# Patient Record
Sex: FEMALE | Race: WHITE | URBAN - METROPOLITAN AREA
[De-identification: names, ages, dates, MRNs, and addresses within clinical notes are randomized per-mention and may not be internally consistent; named-entity substitution may affect disease eponyms.]

---

## 2017-08-07 ENCOUNTER — HOSPITAL ENCOUNTER (OUTPATIENT)
Dept: LAB | Age: 42
Discharge: HOME | End: 2017-08-07
Payer: COMMERCIAL

## 2017-08-07 DIAGNOSIS — Z01.818 PRE-OP EVALUATION: ICD-10-CM

## 2017-08-07 LAB
ANION GAP BLD CALC-SCNC: 8 MMOL/L (ref 3–18)
BUN SERPL-MCNC: 14 MG/DL (ref 7–18)
BUN/CREAT SERPL: 22 (ref 12–20)
CALCIUM SERPL-MCNC: 9.1 MG/DL (ref 8.5–10.1)
CHLORIDE SERPL-SCNC: 104 MMOL/L (ref 100–108)
CO2 SERPL-SCNC: 28 MMOL/L (ref 21–32)
CREAT SERPL-MCNC: 0.63 MG/DL (ref 0.6–1.3)
GLUCOSE SERPL-MCNC: 111 MG/DL (ref 74–99)
POTASSIUM SERPL-SCNC: 4.1 MMOL/L (ref 3.5–5.5)
SODIUM SERPL-SCNC: 140 MMOL/L (ref 136–145)

## 2017-08-07 PROCEDURE — 36415 COLL VENOUS BLD VENIPUNCTURE: CPT | Performed by: ORTHOPAEDIC SURGERY

## 2017-08-07 PROCEDURE — 93005 ELECTROCARDIOGRAM TRACING: CPT

## 2017-08-07 PROCEDURE — 80048 BASIC METABOLIC PNL TOTAL CA: CPT | Performed by: ORTHOPAEDIC SURGERY

## 2017-08-08 LAB
ATRIAL RATE: 57 BPM
CALCULATED P AXIS, ECG09: 72 DEGREES
CALCULATED R AXIS, ECG10: 16 DEGREES
CALCULATED T AXIS, ECG11: 28 DEGREES
DIAGNOSIS, 93000: NORMAL
P-R INTERVAL, ECG05: 126 MS
Q-T INTERVAL, ECG07: 396 MS
QRS DURATION, ECG06: 84 MS
QTC CALCULATION (BEZET), ECG08: 385 MS
VENTRICULAR RATE, ECG03: 57 BPM

## 2020-01-27 ENCOUNTER — TELEPHONE (OUTPATIENT)
Dept: CARDIOLOGY CLINIC | Age: 45
End: 2020-01-27

## 2020-01-27 NOTE — TELEPHONE ENCOUNTER
New patient referral received from [address] [facility] [address] Chantel Drummond to DR Sahil Mora only Dx: abn echo    I tried to reach pt to schedul appt. .. had to lvm    Notes scanned.  Timoteo Phan

## 2020-02-06 ENCOUNTER — OFFICE VISIT (OUTPATIENT)
Dept: CARDIOLOGY CLINIC | Age: 45
End: 2020-02-06

## 2020-02-06 VITALS
HEIGHT: 64 IN | BODY MASS INDEX: 23.56 KG/M2 | HEART RATE: 76 BPM | OXYGEN SATURATION: 98 % | SYSTOLIC BLOOD PRESSURE: 98 MMHG | WEIGHT: 138 LBS | DIASTOLIC BLOOD PRESSURE: 64 MMHG

## 2020-02-06 DIAGNOSIS — R93.1 ABNORMAL ECHOCARDIOGRAM: Primary | ICD-10-CM

## 2020-02-06 RX ORDER — LIDOCAINE AND PRILOCAINE 25; 25 MG/G; MG/G
CREAM TOPICAL AS NEEDED
COMMUNITY

## 2020-02-06 RX ORDER — OLANZAPINE 10 MG/1
10 TABLET ORAL
COMMUNITY

## 2020-02-06 RX ORDER — ROSUVASTATIN CALCIUM 10 MG/1
10 TABLET, COATED ORAL
COMMUNITY

## 2020-02-06 RX ORDER — PREGABALIN 150 MG/1
CAPSULE ORAL
COMMUNITY

## 2020-02-06 RX ORDER — TRIAMCINOLONE ACETONIDE 1 MG/G
CREAM TOPICAL 2 TIMES DAILY
COMMUNITY

## 2020-02-06 RX ORDER — ESCITALOPRAM OXALATE 20 MG/1
20 TABLET ORAL DAILY
COMMUNITY

## 2020-02-06 RX ORDER — PROCHLORPERAZINE MALEATE 10 MG
5 TABLET ORAL
COMMUNITY

## 2020-02-06 RX ORDER — GABAPENTIN 300 MG/1
300 CAPSULE ORAL 3 TIMES DAILY
COMMUNITY

## 2020-02-06 RX ORDER — CLOBETASOL PROPIONATE 0.5 MG/G
CREAM TOPICAL 2 TIMES DAILY
COMMUNITY

## 2020-02-06 RX ORDER — INSULIN LISPRO 100 [IU]/ML
INJECTION, SOLUTION INTRAVENOUS; SUBCUTANEOUS
COMMUNITY

## 2020-02-06 RX ORDER — MELOXICAM 15 MG/1
15 TABLET ORAL DAILY
COMMUNITY

## 2020-02-06 RX ORDER — LAMOTRIGINE 100 MG/1
TABLET ORAL DAILY
COMMUNITY

## 2020-02-06 RX ORDER — LORAZEPAM 0.5 MG/1
TABLET ORAL
COMMUNITY

## 2020-02-06 RX ORDER — ONDANSETRON HYDROCHLORIDE 8 MG/1
8 TABLET, FILM COATED ORAL
COMMUNITY

## 2020-02-06 NOTE — PROGRESS NOTES
Maggie Shaffer    Cardio-Onc Pt, Abn Echo    HPI    Maggie Shaffer is a 53 y.o.  extremely pleasant patient with type 1 diabetes who was diagnosed with invasive mammary carcinoma, invasive carcinoma ER positive ID positive HER-2 nu FISH negative back in 2019. It was metastatic to the right axillary. Her initial chemotherapy was started in 2019 with 4 cycles of AC which completed on . She will continue on Taxol and is currently I believe week 11. Her most recent echocardiogram was 2019 and was normal her EF was 56% and GLS was -20% when compared to her initial baseline echo in 2019 her ejection fraction was 70% and GLS unchanged. She comes today for further recommendations. She has no complaints.     CV RFs: DM type 1 since childhood    Social History     Socioeconomic History    Marital status:      Spouse name: Not on file    Number of children: Not on file    Years of education: Not on file    Highest education level: Not on file   Occupational History    Not on file   Social Needs    Financial resource strain: Not on file    Food insecurity:     Worry: Not on file     Inability: Not on file    Transportation needs:     Medical: Not on file     Non-medical: Not on file   Tobacco Use    Smoking status: Never Smoker    Smokeless tobacco: Never Used   Substance and Sexual Activity    Alcohol use: Not on file    Drug use: Not on file    Sexual activity: Not on file   Lifestyle    Physical activity:     Days per week: Not on file     Minutes per session: Not on file    Stress: Not on file   Relationships    Social connections:     Talks on phone: Not on file     Gets together: Not on file     Attends Congregation service: Not on file     Active member of club or organization: Not on file     Attends meetings of clubs or organizations: Not on file     Relationship status: Not on file    Intimate partner violence:     Fear of current or ex partner: Not on file     Emotionally abused: Not on file     Physically abused: Not on file     Forced sexual activity: Not on file   Other Topics Concern    Not on file   Social History Narrative    Not on file        FH: very strong for cancer, but neg premature ASCVD, no SCD    Review of Systems    14 pt Review of Systems is negative unless otherwise mentioned in the HPI. Wt Readings from Last 3 Encounters:   02/06/20 62.6 kg (138 lb)     Temp Readings from Last 3 Encounters:   No data found for Temp     BP Readings from Last 3 Encounters:   02/06/20 98/64     Pulse Readings from Last 3 Encounters:   02/06/20 76       Physical Exam:    Visit Vitals  BP 98/64 (BP 1 Location: Left arm, BP Patient Position: Sitting)   Pulse 76   Ht 5' 4\" (1.626 m)   Wt 62.6 kg (138 lb)   SpO2 98%   BMI 23.69 kg/m²      Physical Exam  HENT:      Head: Normocephalic and atraumatic. Eyes:      Pupils: Pupils are equal, round, and reactive to light. Cardiovascular:      Rate and Rhythm: Normal rate and regular rhythm. Heart sounds: Normal heart sounds. No murmur. No friction rub. No gallop. Pulmonary:      Effort: Pulmonary effort is normal. No respiratory distress. Breath sounds: Normal breath sounds. No wheezing or rales. Chest:      Chest wall: No tenderness. Abdominal:      General: Bowel sounds are normal.      Palpations: Abdomen is soft. Musculoskeletal:         General: No tenderness. Skin:     General: Skin is warm and dry. Neurological:      Mental Status: She is alert and oriented to person, place, and time.          EKG today shows: NSR, normal axis and intervals, no ST segment abnormalities    Impression and Plan:  Martínez Petty is a 53 y.o. with:    1.) Breast Cancer, s/p AC, currently on Taxol  2.) Abnormal echo, don't suspect any significant subclinical cardiotoxicity  3.) DM1, known  4.) Lower resting SBP noted    1.) No ACEI/ BB at this time  2.) Would recommend one more echocardiogram with strain for comparison  3.) Doubtful any significant cardiomyopathy, but with DM1 and continued Taxol there is risk for myocardial infarction (though low ~ 1-5% currently reported)  4.) RTC as needed, will review repeat echo and can call pt with results    Hi Dr. Tran Membreno,    Though I typically institute ARB/ BB when EF drops less than 10%, her EF was quite hyperdynamic during the first study (which is common in petite women). I think we should do one more echo and just make sure GLS is still normal. This can be done at any time but I'd prefer wherever you have been sending her (so I didn't order it today). Thank you for allowing me to participate in the care of your patient, please do not hesitate to call with questions or concerns.     Kindest Regards,    Jere Neal, DO

## 2020-02-06 NOTE — PROGRESS NOTES
Maggie Shaffer presents today for   Chief Complaint   Patient presents with    New Patient     ref by Dr Lauren Hector preferred language for health care discussion is english/other. Is someone accompanying this pt? no    Is the patient using any DME equipment during OV?no    Depression Screening:  3 most recent PHQ Screens 2/6/2020   Little interest or pleasure in doing things Not at all   Feeling down, depressed, irritable, or hopeless Not at all   Total Score PHQ 2 0       Learning Assessment:  No flowsheet data found. Abuse Screening:  Abuse Screening Questionnaire 2/6/2020   Do you ever feel afraid of your partner? N   Are you in a relationship with someone who physically or mentally threatens you? N   Is it safe for you to go home? Y       Fall Risk  No flowsheet data found. Pt currently taking Anticoagulant therapy? no    Coordination of Care:  1. Have you been to the ER, urgent care clinic since your last visit? Hospitalized since your last visit? no    2. Have you seen or consulted any other health care providers outside of the [address] [place] [address] since your last visit?  Include any pap smears or colon screening. no  ;

## 2020-09-18 ENCOUNTER — OFFICE VISIT (OUTPATIENT)
Dept: ORTHOPEDIC SURGERY | Age: 45
End: 2020-09-18

## 2020-09-18 VITALS
BODY MASS INDEX: 23.9 KG/M2 | WEIGHT: 140 LBS | OXYGEN SATURATION: 99 % | SYSTOLIC BLOOD PRESSURE: 144 MMHG | RESPIRATION RATE: 16 BRPM | HEART RATE: 74 BPM | TEMPERATURE: 97.5 F | DIASTOLIC BLOOD PRESSURE: 89 MMHG | HEIGHT: 64 IN

## 2020-09-18 DIAGNOSIS — M65.341 TRIGGER RING FINGER OF RIGHT HAND: ICD-10-CM

## 2020-09-18 DIAGNOSIS — M65.312 TRIGGER THUMB OF LEFT HAND: Primary | ICD-10-CM

## 2020-09-18 DIAGNOSIS — M65.342 TRIGGER RING FINGER OF LEFT HAND: ICD-10-CM

## 2020-09-18 DIAGNOSIS — M65.311 TRIGGER THUMB OF RIGHT HAND: ICD-10-CM

## 2020-09-18 RX ORDER — EXEMESTANE 25 MG/1
25 TABLET ORAL DAILY
COMMUNITY

## 2020-09-18 RX ORDER — OXYBUTYNIN CHLORIDE 5 MG/1
5 TABLET ORAL 2 TIMES DAILY
COMMUNITY
Start: 2020-05-15 | End: 2021-05-15

## 2020-09-18 RX ORDER — INSULIN LISPRO 100 [IU]/ML
INJECTION, SOLUTION INTRAVENOUS; SUBCUTANEOUS
COMMUNITY
Start: 2020-07-03

## 2020-09-18 RX ORDER — ROSUVASTATIN CALCIUM 10 MG/1
10 TABLET, COATED ORAL DAILY
COMMUNITY

## 2020-09-18 NOTE — PROGRESS NOTES
Jose Ramon Grubbs is a 53 y.o. female right handed unspecified employment. Worker's Compensation and legal considerations: none filed. Vitals:    09/18/20 0819   BP: (!) 144/89   Pulse: 74   Resp: 16   Temp: 97.5 °F (36.4 °C)   TempSrc: Skin   SpO2: 99%   Weight: 140 lb (63.5 kg)   Height: 5' 4\" (1.626 m)   PainSc:   8   PainLoc: Hand           Chief Complaint   Patient presents with    Hand Pain     bilateral         HPI: Patient comes in today with complaints of bilateral hand pain and locking. She localizes it to the thumbs and ring fingers bilaterally. She has a history of breast cancer for which she is currently on estrogen receptor blocker. Date of onset: Several months    Injury: No    Prior Treatment:  No    Numbness/ Tingling: No      ROS: Review of Systems - General ROS: negative  Psychological ROS: negative  ENT ROS: negative  Allergy and Immunology ROS: negative  Hematological and Lymphatic ROS: negative  Respiratory ROS: no cough, shortness of breath, or wheezing  Cardiovascular ROS: no chest pain or dyspnea on exertion  Gastrointestinal ROS: no abdominal pain, change in bowel habits, or black or bloody stools  Musculoskeletal ROS: positive for - pain in finger - bilateral and thumb - bilateral  Neurological ROS: negative  Dermatological ROS: negative    Past Medical History:   Diagnosis Date    [facility] [address]     Diabetes ([facility] [address]        Past Surgical History:   Procedure Laterality Date    HX BILATERAL MASTECTOMY         Current Outpatient Medications   Medication Sig Dispense Refill    exemestane (AROMASIN) 25 mg tablet Take 25 mg by mouth daily.  oxybutynin (DITROPAN) 5 mg tablet Take 5 mg by mouth two (2) times a day.  rosuvastatin (CRESTOR) 10 mg tablet Take 10 mg by mouth daily.       insulin lispro (HumaLOG U-100 Insulin) 100 unit/mL cartridge DELIVER UP TO 50 UNITS VIA INPEN TID AC MEALS      triamcinolone acetonide (KENALOG) 10 mg/mL injection 1 mL by Intra artICUlar route once for 1 dose. 1 Vial 0    meloxicam (MOBIC) 15 mg tablet Take 15 mg by mouth daily.  lamoTRIgine (LAMICTAL) 100 mg tablet Take  by mouth daily.  triamcinolone acetonide (KENALOG) 0.1 % topical cream Apply  to affected area two (2) times a day. use thin layer      rosuvastatin (CRESTOR) 10 mg tablet Take 10 mg by mouth nightly.  insulin lispro (HUMALOG U-100 INSULIN) 100 unit/mL injection by SubCUTAneous route.  escitalopram oxalate (LEXAPRO) 20 mg tablet Take 20 mg by mouth daily.  insulin degludec (TRESIBA FLEXTOUCH U-100 SC) by SubCUTAneous route.  codeine phosphate/guaifenesin (VIRTUSSIN AC PO) Take  by mouth.  cholecalciferol, vitamin D3, (VITAMIN D3) 4,000 unit cap Take  by mouth.  LORazepam (ATIVAN) 0.5 mg tablet Take  by mouth.  OLANZapine (ZYPREXA) 10 mg tablet Take 10 mg by mouth nightly.  ondansetron hcl (ZOFRAN) 8 mg tablet Take 8 mg by mouth every eight (8) hours as needed for Nausea or Vomiting.  prochlorperazine (COMPAZINE) 10 mg tablet Take 5 mg by mouth every six (6) hours as needed for Nausea or Vomiting.  lidocaine-prilocaine (EMLA) topical cream Apply  to affected area as needed for Pain.  DIPHENHYDRAMINE-HYDROCORTISONE EX by Apply Externally route.  phenylephrine HCl (SUDAFED PE PO) Take  by mouth.  gentamicin sulfate (GARAMYCIN OP) Apply  to eye.  clobetasoL (TEMOVATE) 0.05 % topical cream Apply  to affected area two (2) times a day.  pregabalin (LYRICA) 150 mg capsule Take  by mouth.  gabapentin (NEURONTIN) 300 mg capsule Take 300 mg by mouth three (3) times daily. Allergies   Allergen Reactions    Adhesive Other (comments)     Skin burn           PE:     Physical Exam  Vitals signs and nursing note reviewed. Constitutional:       General: She is not in acute distress. Appearance: Normal appearance. She is not ill-appearing.    Eyes:      Extraocular Movements: Extraocular movements intact. Pupils: Pupils are equal, round, and reactive to light. Neck:      Musculoskeletal: Normal range of motion and neck supple. Cardiovascular:      Pulses: Normal pulses. Pulmonary:      Effort: Pulmonary effort is normal. No respiratory distress. Abdominal:      General: Abdomen is flat. There is no distension. Musculoskeletal: Normal range of motion. General: Tenderness present. No swelling, deformity or signs of injury. Right lower leg: No edema. Left lower leg: No edema. Skin:     General: Skin is warm and dry. Capillary Refill: Capillary refill takes less than 2 seconds. Findings: No bruising or erythema. Neurological:      General: No focal deficit present. Mental Status: She is alert and oriented to person, place, and time. Psychiatric:         Mood and Affect: Mood normal.         Behavior: Behavior normal.            Hand:    Examination L Digit(s) R Digit(s)   1st CMC Tenderness -  -    1st CMC Grind -  -    Trina Nodes -  -    Heberden Nodes -  -    A1 Pulley Tenderness + Th, RF + Th, RF   Triggering + Th, RF + Th, RF   UCL Instability -  -    RCL Instability -  -    Lateral Stress Pain -  -    Palmar Cords -  -    Tabletop test -  -    Garrod's Pads -  -     Strength       Pinch Strength         ROM: Full        Imaging:     None indicated        ICD-10-CM ICD-9-CM    1. Trigger thumb of left hand  M65.312 727.03 INJECT TENDON SHEATH/LIGAMENT      TRIAMCINOLONE ACETONIDE INJ      triamcinolone acetonide (KENALOG) 10 mg/mL injection   2. Trigger thumb of right hand  M65.311 727.03 INJECT TENDON SHEATH/LIGAMENT      TRIAMCINOLONE ACETONIDE INJ      triamcinolone acetonide (KENALOG) 10 mg/mL injection   3. Trigger ring finger of left hand  M65.342 727.03 TRIAMCINOLONE ACETONIDE INJ      triamcinolone acetonide (KENALOG) 10 mg/mL injection      INJECT TENDON SHEATH/LIGAMENT   4.  Trigger ring finger of right hand  M65.341 727.03 TRIAMCINOLONE ACETONIDE INJ      triamcinolone acetonide (KENALOG) 10 mg/mL injection      INJECT TENDON SHEATH/LIGAMENT         Plan:     Bilateral thumb and bilateral ring finger A1 pulley injections    Follow-up and Dispositions    · Return if symptoms worsen or fail to improve. Plan was reviewed with patient, who verbalized agreement and understanding of the plan    Megan 36 NOTE        Chart reviewed for the following:   Alirio SALINAS DO, have reviewed the History, Physical and updated the Allergic reactions for [address] performed immediately prior to start of procedure:   Alirio SALINAS DO, have performed the following reviews on [facility] prior to the start of the procedure:            * Patient was identified by name and date of birth   * Agreement on procedure being performed was verified  * Risks and Benefits explained to the patient  * Procedure site verified and marked as necessary  * Patient was positioned for comfort  * Consent was signed and verified     Time: 08:35 AM      Date of procedure: 9/18/2020    Procedure performed by: Mirian Zambrano DO    Provider assisted by: Rojelio Benito LPN    Patient assisted by: self    How tolerated by patient: tolerated the procedure well with no complications    Post Procedural Pain Scale: 0 - No Hurt    Comments: none    Procedure:  After consent was obtained, using sterile technique the tendon was prepped. Local anesthetic used: 1% lidocaine. Kenalog 5 mg X4 and was then injected and the needle withdrawn. The procedure was well tolerated. The patient is asked to continue to rest the area for a few more days before resuming regular activities. It may be more painful for the first 1-2 days. Watch for fever, or increased swelling or persistent pain in the joint.  Call or return to clinic prn if such symptoms occur or there is failure to improve as anticipated.

## 2021-02-04 ENCOUNTER — OFFICE VISIT (OUTPATIENT)
Dept: ORTHOPEDIC SURGERY | Age: 46
End: 2021-02-04
Payer: COMMERCIAL

## 2021-02-04 VITALS
DIASTOLIC BLOOD PRESSURE: 78 MMHG | WEIGHT: 142 LBS | HEART RATE: 89 BPM | BODY MASS INDEX: 24.24 KG/M2 | HEIGHT: 64 IN | TEMPERATURE: 97.7 F | SYSTOLIC BLOOD PRESSURE: 121 MMHG | OXYGEN SATURATION: 99 %

## 2021-02-04 DIAGNOSIS — M65.331 TRIGGER MIDDLE FINGER OF RIGHT HAND: Primary | ICD-10-CM

## 2021-02-04 PROCEDURE — 99213 OFFICE O/P EST LOW 20 MIN: CPT | Performed by: ORTHOPAEDIC SURGERY

## 2021-02-04 PROCEDURE — 20550 NJX 1 TENDON SHEATH/LIGAMENT: CPT | Performed by: ORTHOPAEDIC SURGERY

## 2021-02-04 NOTE — PROGRESS NOTES
Sergio Castanon is a 50 y.o. female right handed unspecified employment. Worker's Compensation and legal considerations: none filed. Vitals:    02/04/21 0753   BP: 121/78   Pulse: 89   Temp: 97.7 °F (36.5 °C)   TempSrc: Skin   SpO2: 99%   Weight: 142 lb (64.4 kg)   Height: 5' 4\" (1.626 m)   PainSc:   2   PainLoc: Hand           Chief Complaint   Patient presents with    Hand Pain     right       HPI: Patient presents today with complaints of right middle finger pain and locking. Initial HPI: Patient comes in today with complaints of bilateral hand pain and locking. She localizes it to the thumbs and ring fingers bilaterally. She has a history of breast cancer for which she is currently on estrogen receptor blocker. Date of onset: 12/2020    Injury: No    Prior Treatment:  Yes: Comment: Bilateral thumb and bilateral ring finger A1 pulley injections    Numbness/ Tingling: No      ROS: Review of Systems - General ROS: negative  Psychological ROS: negative  ENT ROS: negative  Allergy and Immunology ROS: negative  Hematological and Lymphatic ROS: negative  Respiratory ROS: no cough, shortness of breath, or wheezing  Cardiovascular ROS: no chest pain or dyspnea on exertion  Gastrointestinal ROS: no abdominal pain, change in bowel habits, or black or bloody stools  Musculoskeletal ROS: positive for - pain in finger -right  Neurological ROS: negative  Dermatological ROS: negative    Past Medical History:   Diagnosis Date    [facility] ([facility] [address]     Diabetes ([facility] [address]        Past Surgical History:   Procedure Laterality Date    HX BILATERAL MASTECTOMY         Current Outpatient Medications   Medication Sig Dispense Refill    exemestane (AROMASIN) 25 mg tablet Take 25 mg by mouth daily.  oxybutynin (DITROPAN) 5 mg tablet Take 5 mg by mouth two (2) times a day.  rosuvastatin (CRESTOR) 10 mg tablet Take 10 mg by mouth daily.       insulin lispro (HumaLOG U-100 Insulin) 100 unit/mL cartridge DELIVER UP TO 50 UNITS VIA INPEN TID AC MEALS      meloxicam (MOBIC) 15 mg tablet Take 15 mg by mouth daily.  lamoTRIgine (LAMICTAL) 100 mg tablet Take  by mouth daily.  triamcinolone acetonide (KENALOG) 0.1 % topical cream Apply  to affected area two (2) times a day. use thin layer      rosuvastatin (CRESTOR) 10 mg tablet Take 10 mg by mouth nightly.  insulin lispro (HUMALOG U-100 INSULIN) 100 unit/mL injection by SubCUTAneous route.  escitalopram oxalate (LEXAPRO) 20 mg tablet Take 20 mg by mouth daily.  insulin degludec (TRESIBA FLEXTOUCH U-100 SC) by SubCUTAneous route.  codeine phosphate/guaifenesin (VIRTUSSIN AC PO) Take  by mouth.  cholecalciferol, vitamin D3, (VITAMIN D3) 4,000 unit cap Take  by mouth.  LORazepam (ATIVAN) 0.5 mg tablet Take  by mouth.  OLANZapine (ZYPREXA) 10 mg tablet Take 10 mg by mouth nightly.  ondansetron hcl (ZOFRAN) 8 mg tablet Take 8 mg by mouth every eight (8) hours as needed for Nausea or Vomiting.  prochlorperazine (COMPAZINE) 10 mg tablet Take 5 mg by mouth every six (6) hours as needed for Nausea or Vomiting.  lidocaine-prilocaine (EMLA) topical cream Apply  to affected area as needed for Pain.  DIPHENHYDRAMINE-HYDROCORTISONE EX by Apply Externally route.  phenylephrine HCl (SUDAFED PE PO) Take  by mouth.  gentamicin sulfate (GARAMYCIN OP) Apply  to eye.  clobetasoL (TEMOVATE) 0.05 % topical cream Apply  to affected area two (2) times a day.  pregabalin (LYRICA) 150 mg capsule Take  by mouth.  gabapentin (NEURONTIN) 300 mg capsule Take 300 mg by mouth three (3) times daily. Allergies   Allergen Reactions    Adhesive Other (comments)     Skin burn           PE:     Physical Exam  Vitals signs and nursing note reviewed. Constitutional:       General: She is not in acute distress. Appearance: Normal appearance. She is not ill-appearing.    Eyes:      Extraocular Movements: Extraocular movements intact. Pupils: Pupils are equal, round, and reactive to light. Neck:      Musculoskeletal: Normal range of motion and neck supple. Cardiovascular:      Pulses: Normal pulses. Pulmonary:      Effort: Pulmonary effort is normal. No respiratory distress. Abdominal:      General: Abdomen is flat. There is no distension. Musculoskeletal: Normal range of motion. General: Tenderness present. No swelling, deformity or signs of injury. Right lower leg: No edema. Left lower leg: No edema. Skin:     General: Skin is warm and dry. Capillary Refill: Capillary refill takes less than 2 seconds. Findings: No bruising or erythema. Neurological:      General: No focal deficit present. Mental Status: She is alert and oriented to person, place, and time. Psychiatric:         Mood and Affect: Mood normal.         Behavior: Behavior normal.            Hand:    Examination L Digit(s) R Digit(s)   1st CMC Tenderness -  -    1st CMC Grind -  -    Trina Nodes -  -    Heberden Nodes -  -    A1 Pulley Tenderness -  + LF   Triggering -  + LF   UCL Instability -  -    RCL Instability -  -    Lateral Stress Pain -  -    Palmar Cords -  -    Tabletop test -  -    Garrod's Pads -  -     Strength       Pinch Strength         ROM: Full        Imaging:     None indicated        ICD-10-CM ICD-9-CM    1. Trigger middle finger of right hand  M65.331 727.03 triamcinolone acetonide (KENALOG) 10 mg/mL injection 5 mg      INJECT TENDON SHEATH/LIGAMENT         Plan:     Right middle finger A1 pulley injection    Follow-up and Dispositions    · Return if symptoms worsen or fail to improve.           Plan was reviewed with patient, who verbalized agreement and understanding of the plan    Megan 36 NOTE        Chart reviewed for the following:   IAlirio DO, have reviewed the History, Physical and updated the Allergic reactions for [address] performed immediately prior to start of procedure:   Alirio SALINAS DO, have performed the following reviews on [facility] prior to the start of the procedure:            * Patient was identified by name and date of birth   * Agreement on procedure being performed was verified  * Risks and Benefits explained to the patient  * Procedure site verified and marked as necessary  * Patient was positioned for comfort  * Consent was signed and verified     Time: 08:10 AM      Date of procedure: 2/4/2021    Procedure performed by: Debbie Cruz DO    Provider assisted by: Kaylee Pearl LPN    Patient assisted by: self    How tolerated by patient: tolerated the procedure well with no complications    Post Procedural Pain Scale: 0 - No Hurt    Comments: none    Procedure:  After consent was obtained, using sterile technique the tendon was prepped. Local anesthetic used: 1% lidocaine. Kenalog 5 mg and was then injected and the needle withdrawn. The procedure was well tolerated. The patient is asked to continue to rest the area for a few more days before resuming regular activities. It may be more painful for the first 1-2 days. Watch for fever, or increased swelling or persistent pain in the joint. Call or return to clinic prn if such symptoms occur or there is failure to improve as anticipated.

## 2022-08-17 ENCOUNTER — OFFICE VISIT (OUTPATIENT)
Dept: ORTHOPEDIC SURGERY | Age: 47
End: 2022-08-17
Payer: COMMERCIAL

## 2022-08-17 VITALS — WEIGHT: 146.2 LBS | BODY MASS INDEX: 24.96 KG/M2 | TEMPERATURE: 99.1 F | HEIGHT: 64 IN

## 2022-08-17 DIAGNOSIS — M65.332 TRIGGER MIDDLE FINGER OF LEFT HAND: Primary | ICD-10-CM

## 2022-08-17 DIAGNOSIS — M72.0 DUPUYTREN'S CONTRACTURE OF LEFT HAND: ICD-10-CM

## 2022-08-17 DIAGNOSIS — M65.331 TRIGGER FINGER, RIGHT MIDDLE FINGER: ICD-10-CM

## 2022-08-17 PROCEDURE — 20550 NJX 1 TENDON SHEATH/LIGAMENT: CPT | Performed by: ORTHOPAEDIC SURGERY

## 2022-08-17 PROCEDURE — 99214 OFFICE O/P EST MOD 30 MIN: CPT | Performed by: ORTHOPAEDIC SURGERY

## 2022-08-17 NOTE — PROGRESS NOTES
Diana Davenport is a 48 y.o. female right handed unspecified employment. Worker's Compensation and legal considerations: none filed. Vitals:    08/17/22 1325   Temp: 99.1 °F (37.3 °C)   TempSrc: Temporal   Weight: 146 lb 3.2 oz (66.3 kg)   Height: 5' 4\" (1.626 m)   PainSc:   6   PainLoc: Hand           Chief Complaint   Patient presents with    Hand Pain     Bilateral         HPI: Patient presents today with complaints of right middle finger pain and locking. Initial HPI: Patient comes in today with complaints of bilateral hand pain and locking. She localizes it to the thumbs and ring fingers bilaterally. She has a history of breast cancer for which she is currently on estrogen receptor blocker. Date of onset: 12/2020    Injury: No    Prior Treatment:  Yes: Comment: Bilateral thumb and bilateral ring finger A1 pulley injections    Numbness/ Tingling: No      ROS: Review of Systems - General ROS: negative  Psychological ROS: negative  ENT ROS: negative  Allergy and Immunology ROS: negative  Hematological and Lymphatic ROS: negative  Respiratory ROS: no cough, shortness of breath, or wheezing  Cardiovascular ROS: no chest pain or dyspnea on exertion  Gastrointestinal ROS: no abdominal pain, change in bowel habits, or black or bloody stools  Musculoskeletal ROS: positive for - pain in finger -right  Neurological ROS: negative  Dermatological ROS: negative    Past Medical History:   Diagnosis Date    Breast cancer ([facility] [address]     Diabetes ([facility] [address]        Past Surgical History:   Procedure Laterality Date    HX BILATERAL MASTECTOMY         Current Outpatient Medications   Medication Sig Dispense Refill    exemestane (AROMASIN) 25 mg tablet Take 25 mg by mouth daily. rosuvastatin (CRESTOR) 10 mg tablet Take 10 mg by mouth daily. insulin lispro (HumaLOG U-100 Insulin) 100 unit/mL cartridge DELIVER UP TO 50 UNITS VIA INPEN TID AC MEALS      meloxicam (MOBIC) 15 mg tablet Take 15 mg by mouth daily. lamoTRIgine (LAMICTAL) 100 mg tablet Take  by mouth daily. triamcinolone acetonide (KENALOG) 0.1 % topical cream Apply  to affected area two (2) times a day. use thin layer      rosuvastatin (CRESTOR) 10 mg tablet Take 10 mg by mouth nightly. insulin lispro (HUMALOG U-100 INSULIN) 100 unit/mL injection by SubCUTAneous route. escitalopram oxalate (LEXAPRO) 20 mg tablet Take 20 mg by mouth daily. insulin degludec (TRESIBA FLEXTOUCH U-100 SC) by SubCUTAneous route. codeine phosphate/guaifenesin (VIRTUSSIN AC PO) Take  by mouth. cholecalciferol, vitamin D3, (VITAMIN D3) 4,000 unit cap Take  by mouth. LORazepam (ATIVAN) 0.5 mg tablet Take  by mouth. OLANZapine (ZYPREXA) 10 mg tablet Take 10 mg by mouth nightly. ondansetron hcl (ZOFRAN) 8 mg tablet Take 8 mg by mouth every eight (8) hours as needed for Nausea or Vomiting. prochlorperazine (COMPAZINE) 10 mg tablet Take 5 mg by mouth every six (6) hours as needed for Nausea or Vomiting.      lidocaine-prilocaine (EMLA) topical cream Apply  to affected area as needed for Pain. DIPHENHYDRAMINE-HYDROCORTISONE EX by Apply Externally route. phenylephrine HCl (SUDAFED PE PO) Take  by mouth.      gentamicin sulfate (GARAMYCIN OP) Apply  to eye.      clobetasoL (TEMOVATE) 0.05 % topical cream Apply  to affected area two (2) times a day. pregabalin (LYRICA) 150 mg capsule Take  by mouth.      gabapentin (NEURONTIN) 300 mg capsule Take 300 mg by mouth three (3) times daily. Allergies   Allergen Reactions    Adhesive Other (comments)     Skin burn           PE:     Physical Exam  Vitals and nursing note reviewed. Constitutional:       General: She is not in acute distress. Appearance: Normal appearance. She is not ill-appearing. Cardiovascular:      Pulses: Normal pulses. Pulmonary:      Effort: Pulmonary effort is normal. No respiratory distress. Musculoskeletal:         General: Tenderness present. No swelling, deformity or signs of injury. Normal range of motion. Cervical back: Normal range of motion and neck supple. Right lower leg: No edema. Left lower leg: No edema. Skin:     General: Skin is warm and dry. Capillary Refill: Capillary refill takes less than 2 seconds. Findings: No bruising or erythema. Neurological:      General: No focal deficit present. Mental Status: She is alert and oriented to person, place, and time. Psychiatric:         Mood and Affect: Mood normal.         Behavior: Behavior normal.          Hand:    Examination L Digit(s) R Digit(s)   1st [facility] Tenderness -  -    1st [facility] Grind -  -    Trina Nodes -  -    [place] Nodes -  -    A1 Pulley Tenderness -  + LF   Triggering -  + LF   UCL Instability -  -    RCL Instability -  -    Lateral Stress Pain -  -    Palmar Cords -  -    Tabletop test -  -    Garrod's Pads -  -     Strength       Pinch Strength         ROM: Full        Imaging:     None indicated        ICD-10-CM ICD-9-CM    1. Trigger middle finger of left hand  M65.332 727.03 INJECT TENDON SHEATH/LIGAMENT      triamcinolone acetonide (KENALOG) 10 mg/mL injection 10 mg      2. Trigger finger, right middle finger  M65.331 727.03 INJECT TENDON SHEATH/LIGAMENT      triamcinolone acetonide (KENALOG) 10 mg/mL injection 10 mg      3. Dupuytren's contracture of left hand  M72.0 728.6             Plan:     Bilateral middle finger A1 pulley injections    Observations for the Dupuytren's contracture for now. Follow-up and Dispositions    Return if symptoms worsen or fail to improve.           Plan was reviewed with patient, who verbalized agreement and understanding of the plan    Megan [address] NOTE        Chart reviewed for the following:   IAlirio DO, have reviewed the History, Physical and updated the Allergic reactions for [address] performed immediately prior to start of procedure:   Alirio SALINAS DO, have performed the following reviews on Francisco Flores prior to the start of the procedure:            * Patient was identified by name and date of birth   * Agreement on procedure being performed was verified  * Risks and Benefits explained to the patient  * Procedure site verified and marked as necessary  * Patient was positioned for comfort  * Consent was signed and verified     Time: 13:51      Date of procedure: 8/17/2022    Procedure performed by: Dave Hanks DO    Provider assisted by: Chasity Pelayo MA    Patient assisted by: self    How tolerated by patient: tolerated the procedure well with no complications    Post Procedural Pain Scale: 0 - No Hurt    Comments: none    Procedure:  After consent was obtained, using sterile technique the bilateral middle fingers was prepped. Local anesthetic used: 1% lidocaine. Kenalog 5 mg and was then injected and the needle withdrawn. The procedure was well tolerated. The patient is asked to continue to rest the area for a few more days before resuming regular activities. It may be more painful for the first 1-2 days. Watch for fever, or increased swelling or persistent pain in the joint. Call or return to clinic prn if such symptoms occur or there is failure to improve as anticipated.

## 2023-12-14 ENCOUNTER — HOSPITAL ENCOUNTER (OUTPATIENT)
Facility: HOSPITAL | Age: 48
Discharge: HOME | End: 2023-12-14

## 2023-12-14 DIAGNOSIS — Z13.6 SCREENING FOR HEART DISEASE: ICD-10-CM

## 2023-12-14 PROCEDURE — 75571 CT HRT W/O DYE W/CA TEST: CPT
